# Patient Record
Sex: FEMALE | Race: WHITE
[De-identification: names, ages, dates, MRNs, and addresses within clinical notes are randomized per-mention and may not be internally consistent; named-entity substitution may affect disease eponyms.]

---

## 2017-01-07 DIAGNOSIS — I10 HYPERTENSION, GOAL BELOW 140/90: Primary | ICD-10-CM

## 2017-01-09 NOTE — TELEPHONE ENCOUNTER
METOPROLOL TART 25MG TABS      Last Written Prescription Date: 12/23/16  Last Fill Quantity: 90 TABLETS, # refills: 3    Last Office Visit with [facility], [facility] or [facility] prescribing provider:  12/23/16 JORGE   [facility] Office Visit:        BP Readings from Last 3 Encounters:   12/23/16 138/60   03/07/16 130/76   12/08/15 132/58

## 2017-01-10 RX ORDER — METOPROLOL TARTRATE 25 MG/1
TABLET, FILM COATED ORAL
Qty: 30 TABLET | Refills: 0 | OUTPATIENT
Start: 2017-01-10

## 2019-12-08 ENCOUNTER — HEALTH MAINTENANCE LETTER (OUTPATIENT)
Age: 74
End: 2019-12-08

## 2020-01-28 ENCOUNTER — TELEPHONE (OUTPATIENT)
Dept: FAMILY MEDICINE | Facility: OTHER | Age: 75
End: 2020-01-28

## 2020-01-28 NOTE — TELEPHONE ENCOUNTER
Forms from [facility] digestive faxed back per T.C. as patient not seen here for 2 years.  Placed in scanning folder.   Sarah Kellogg MA on 1/28/2020 at 5:00 PM

## 2020-03-15 ENCOUNTER — HEALTH MAINTENANCE LETTER (OUTPATIENT)
Age: 75
End: 2020-03-15

## 2022-08-14 ENCOUNTER — TRANSFERRED RECORDS (OUTPATIENT)
Dept: FAMILY MEDICINE | Facility: OTHER | Age: 77
End: 2022-08-14

## 2022-08-14 LAB
ALT SERPL-CCNC: 15 U/L (ref 0–55)
AST SERPL-CCNC: 14 U/L (ref 5–34)
CREATININE (EXTERNAL): 0.79 MG/DL (ref 0.55–1.02)
GLUCOSE (EXTERNAL): 570 MG/DL (ref 70–105)
INR (EXTERNAL): 1 (ref 0.8–1.1)
POTASSIUM (EXTERNAL): 4.6 MMOL/L (ref 3.5–5.1)